# Patient Record
Sex: MALE | Race: WHITE
[De-identification: names, ages, dates, MRNs, and addresses within clinical notes are randomized per-mention and may not be internally consistent; named-entity substitution may affect disease eponyms.]

---

## 2022-11-30 ENCOUNTER — HOSPITAL ENCOUNTER (EMERGENCY)
Dept: HOSPITAL 47 - EC | Age: 77
Discharge: HOME | End: 2022-11-30
Payer: MEDICARE

## 2022-11-30 VITALS
HEART RATE: 104 BPM | DIASTOLIC BLOOD PRESSURE: 73 MMHG | TEMPERATURE: 98.6 F | SYSTOLIC BLOOD PRESSURE: 95 MMHG | RESPIRATION RATE: 16 BRPM

## 2022-11-30 DIAGNOSIS — M47.896: Primary | ICD-10-CM

## 2022-11-30 DIAGNOSIS — Z87.891: ICD-10-CM

## 2022-11-30 LAB
ALBUMIN SERPL-MCNC: 4.6 G/DL (ref 3.5–5)
ALP SERPL-CCNC: 87 U/L (ref 38–126)
ALT SERPL-CCNC: 29 U/L (ref 4–49)
ANION GAP SERPL CALC-SCNC: 10 MMOL/L
AST SERPL-CCNC: 68 U/L (ref 17–59)
BASOPHILS # BLD AUTO: 0.1 K/UL (ref 0–0.2)
BASOPHILS NFR BLD AUTO: 1 %
BUN SERPL-SCNC: 18 MG/DL (ref 9–20)
CALCIUM SPEC-MCNC: 8.9 MG/DL (ref 8.4–10.2)
CHLORIDE SERPL-SCNC: 101 MMOL/L (ref 98–107)
CO2 SERPL-SCNC: 23 MMOL/L (ref 22–30)
EOSINOPHIL # BLD AUTO: 0.1 K/UL (ref 0–0.7)
EOSINOPHIL NFR BLD AUTO: 1 %
ERYTHROCYTE [DISTWIDTH] IN BLOOD BY AUTOMATED COUNT: 4.64 M/UL (ref 4.3–5.9)
ERYTHROCYTE [DISTWIDTH] IN BLOOD: 12.4 % (ref 11.5–15.5)
GLUCOSE SERPL-MCNC: 157 MG/DL (ref 74–99)
HCT VFR BLD AUTO: 43.3 % (ref 39–53)
HGB BLD-MCNC: 14.9 GM/DL (ref 13–17.5)
LYMPHOCYTES # SPEC AUTO: 0.3 K/UL (ref 1–4.8)
LYMPHOCYTES NFR SPEC AUTO: 3 %
MCH RBC QN AUTO: 32 PG (ref 25–35)
MCHC RBC AUTO-ENTMCNC: 34.3 G/DL (ref 31–37)
MCV RBC AUTO: 93.3 FL (ref 80–100)
MONOCYTES # BLD AUTO: 0.6 K/UL (ref 0–1)
MONOCYTES NFR BLD AUTO: 6 %
NEUTROPHILS # BLD AUTO: 7.7 K/UL (ref 1.3–7.7)
NEUTROPHILS NFR BLD AUTO: 87 %
PLATELET # BLD AUTO: 218 K/UL (ref 150–450)
POTASSIUM SERPL-SCNC: 5.8 MMOL/L (ref 3.5–5.1)
PROT SERPL-MCNC: 8.1 G/DL (ref 6.3–8.2)
SODIUM SERPL-SCNC: 134 MMOL/L (ref 137–145)
WBC # BLD AUTO: 8.8 K/UL (ref 3.8–10.6)

## 2022-11-30 PROCEDURE — 72131 CT LUMBAR SPINE W/O DYE: CPT

## 2022-11-30 PROCEDURE — 72192 CT PELVIS W/O DYE: CPT

## 2022-11-30 PROCEDURE — 36415 COLL VENOUS BLD VENIPUNCTURE: CPT

## 2022-11-30 PROCEDURE — 99284 EMERGENCY DEPT VISIT MOD MDM: CPT

## 2022-11-30 PROCEDURE — 85025 COMPLETE CBC W/AUTO DIFF WBC: CPT

## 2022-11-30 PROCEDURE — 84132 ASSAY OF SERUM POTASSIUM: CPT

## 2022-11-30 PROCEDURE — 80053 COMPREHEN METABOLIC PANEL: CPT

## 2022-11-30 NOTE — CT
EXAMINATION TYPE: CT pelvis wo con

CT DLP: 2994.9 mGycm, Automated exposure control for dose reduction was used.

 

DATE OF EXAM: 11/30/2022 8:45 PM

 

COMPARISON: None

 

CLINICAL INDICATION:Male, 77 years old with history of Right hip pain.

 

TECHNIQUE:  Axial CT of the pelvis. Sagittal and coronal reformats were created on a separate worksta
tion. 

 

Contrast used: None

Oral contrast used: without Oral Contrast 

 

FINDINGS: 

BLADDER: Unremarkable

REPRODUCTIVE: Unremarkable.

STOMACH AND BOWEL: No evidence of bowel obstruction. Scattered clonic diverticula present.

PERITONEUM: No evidence of pneumoperitoneum or free fluid.

VASCULATURE: No evidence of aortic aneurysm. 

MUSCULOSKELETAL: Osteophyte formation of the femoral head and acetabulum bilaterally. Degeneration ch
anges of the lower lumbar spine. No evidence of fracture.  There is bone-on-bone articulation of the 
right hip with subchondral cystic changes and sclerosis most pronounced posteriorly..

LYMPH NODES: No gross evidence for lymphadenopathy.

SOFT TISSUE/ABDOMINAL WALL: There is large right and small left fat containing inguinal hernia.

 

IMPRESSION:

1.  No evidence of fracture.

2. End-stage right hip osteoarthrosis.

3. Bilateral fat containing inguinal hernia, right greater than left.

4. Clonic diverticulosis.

5. Multilevel disc degeneration changes.

## 2022-11-30 NOTE — ED
Back Pain HPI





- General


Chief Complaint: Back Pain/Injury


Stated Complaint: back pain


Time Seen by Provider: 11/30/22 19:47


Source: family, RN notes reviewed


Limitations: no limitations





- History of Present Illness


Initial Comments: 





This is a pleasant 77-year-old male with a history of dementia.  Patient 

recently moved here from [place] and is now staying at the [facility] 

assisted living.  Patient is a poor historian due to baseline dementia but 

states that he turned over in bed and felt a pain in his right lower back and 

right hip area.  Patient had a hard time standing.  Patient here with family 

members who gave him extra strength Tylenol prior to arrival.  Patient states he

is doing better now.  Patient was able to ambulate.  He is denying any fall.  

Pain is exacerbated by movement, alleviated by rest.





Review of systems Limited due to patient's dementia but he specifically denies 

any headache, neck pain, chest pain or shortness of breath.  No abdominal pain. 

States she feels well otherwise.


MD Complaint: back pain





- Related Data


                                    Allergies











Allergy/AdvReac Type Severity Reaction Status Date / Time


 


No Known Allergies Allergy   Verified 11/30/22 20:01














Review of Systems


ROS Statement: 


Those systems with pertinent positive or pertinent negative responses have been 

documented in the HPI.





ROS Other: All systems not noted in ROS Statement are negative.





Past Medical History


Past Medical History: Dementia


Additional Past Medical History / Comment(s): back pain


History of Any Multi-Drug Resistant Organisms: None Reported


Past Surgical History: Appendectomy, Cholecystectomy, Pacemaker


Past Psychological History: No Psychological Hx Reported


Smoking Status: Former smoker


Past Alcohol Use History: None Reported


Past Drug Use History: None Reported





General Exam





- General Exam Comments


Initial Comments: 





Patient essentially alert and oriented 2.  He is disoriented to time and 

situation to symptoms.In the hospital and believes he still in [place].  

Sounds like this is baseline for the patient.. Patient physically deconditioned.


Limitations: no limitations


General appearance: alert, in no apparent distress


Head exam: Present: atraumatic, normocephalic, normal inspection


Eye exam: Present: normal appearance, PERRL, EOMI.  Absent: scleral icterus, 

conjunctival injection, periorbital swelling


ENT exam: Present: normal exam, normal oropharynx, mucous membranes dry, mucous 

membranes moist, normal external ear exam


Neck exam: Present: normal inspection, full ROM.  Absent: tenderness, 

meningismus, lymphadenopathy


Respiratory exam: Present: normal lung sounds bilaterally.  Absent: respiratory 

distress, wheezes, rales, rhonchi, stridor


Cardiovascular Exam: Present: regular rate, normal rhythm, normal heart sounds. 

Absent: systolic murmur, diastolic murmur, rubs, gallop, clicks


GI/Abdominal exam: Present: soft, normal bowel sounds.  Absent: distended, 

tenderness, guarding, rebound, rigid


Extremities exam: Present: normal inspection, full ROM, normal capillary refill.

 Absent: tenderness, pedal edema, joint swelling, calf tenderness


Back exam: Present: normal inspection, tenderness (Right lumbar paraspinals and 

right hip area.  No erythema.  No break in skin integrity.), paraspinal 

tenderness.  Absent: CVA tenderness (L), muscle spasm, vertebral tenderness, 

rash noted


Neurological exam: Present: alert, CN II-XII intact, other (Straight leg raise 

is negative.  Patient does have some pain in the right hip with internal and 

external rotation.  No evidence of vascular insult.).  Absent: motor sensory 

deficit


Psychiatric exam: Present: normal affect, normal mood.  Absent: anxious, flat 

affect, manic


Skin exam: Present: warm, dry, intact, normal color.  Absent: rash





Course


                                   Vital Signs











  11/30/22





  19:44


 


Temperature 98.6 F


 


Pulse Rate 104 H


 


Respiratory 16





Rate 


 


Blood Pressure 95/73


 


O2 Sat by Pulse 94 L





Oximetry 














- Reevaluation(s)


Reevaluation #1: 





11/30/22 21:27


Medical record is reviewed


Symptoms are improved here in the emergency department


Patient is informed of results and questions answered


Patient in no distress


Reevaluation #2: 





11/30/22 22:16


Patient doing well.  Able to ambulate without difficulty.





Medical Decision Making





- Medical Decision Making





Patient's right low back pain and right hip pain appears to be musculoskeletal 

in origin.  This is reproducible with movement and palpation.  There appears to 

be well otherwise.  Patient is severely deconditioned.  No evidence of trauma.





There is no acute fracture noted on computed tomography scan.  Patient was able 

to ambulate without difficulty.  We will send the patient back to the assisted 

living facility.  I did give the patient follow-up with orthopedics.  Discussed 

all findings with the family and the patient.  Patient has severe DJD involving 

the lumbosacral spine as well as the right hip.  Pain is adequately controlled 

at this time with Tylenol.





Patient was told to return to the ER for any signs or symptoms worsen.  Told to 

return immediately if any other problems arise.  All questions answered.  

Treatment plan discussed.  Patient in agreement


Every effort has been made to ensure accuracy of this dictation.  However, due 

to the limitations of electronic medical records and dictation devices, errors 

in charting still occur.





Released with family in stable condition.  Same was normal.





Supervising physician Dr. Arnold





- Lab Data


Result diagrams: 


                                 11/30/22 20:10





                                 11/30/22 21:16


                                   Lab Results











  11/30/22 11/30/22 11/30/22 Range/Units





  20:10 20:10 21:16 


 


WBC  8.8    (3.8-10.6)  k/uL


 


RBC  4.64    (4.30-5.90)  m/uL


 


Hgb  14.9    (13.0-17.5)  gm/dL


 


Hct  43.3    (39.0-53.0)  %


 


MCV  93.3    (80.0-100.0)  fL


 


MCH  32.0    (25.0-35.0)  pg


 


MCHC  34.3    (31.0-37.0)  g/dL


 


RDW  12.4    (11.5-15.5)  %


 


Plt Count  218    (150-450)  k/uL


 


MPV  7.8    


 


Neutrophils %  87    %


 


Lymphocytes %  3    %


 


Monocytes %  6    %


 


Eosinophils %  1    %


 


Basophils %  1    %


 


Neutrophils #  7.7    (1.3-7.7)  k/uL


 


Lymphocytes #  0.3 L    (1.0-4.8)  k/uL


 


Monocytes #  0.6    (0-1.0)  k/uL


 


Eosinophils #  0.1    (0-0.7)  k/uL


 


Basophils #  0.1    (0-0.2)  k/uL


 


Sodium   134 L   (137-145)  mmol/L


 


Potassium   5.8 H  4.4  (3.5-5.1)  mmol/L


 


Chloride   101   ()  mmol/L


 


Carbon Dioxide   23   (22-30)  mmol/L


 


Anion Gap   10   mmol/L


 


BUN   18   (9-20)  mg/dL


 


Creatinine   0.95   (0.66-1.25)  mg/dL


 


Est GFR (CKD-EPI)AfAm   >90   (>60 ml/min/1.73 sqM)  


 


Est GFR (CKD-EPI)NonAf   78   (>60 ml/min/1.73 sqM)  


 


Glucose   157 H   (74-99)  mg/dL


 


Calcium   8.9   (8.4-10.2)  mg/dL


 


Total Bilirubin   1.6 H   (0.2-1.3)  mg/dL


 


AST   68 H   (17-59)  U/L


 


ALT   29   (4-49)  U/L


 


Alkaline Phosphatase   87   ()  U/L


 


Total Protein   8.1   (6.3-8.2)  g/dL


 


Albumin   4.6   (3.5-5.0)  g/dL














- Radiology Data


Radiology results: report reviewed, image reviewed


Computed tomography scan of the lumbar spine and pelvis interpreted by me reveal

s extensive degenerative changes with no acute findings.


Current radiology report





Disposition


Clinical Impression: 


 Arthralgia of right hip, DJD (degenerative joint disease), lumbar





Disposition: HOME SELF-CARE


Condition: Good


Instructions (If sedation given, give patient instructions):  Acute Low Back 

Pain (ED), Hip Pain (ED)


Additional Instructions: 


Make an appointment with the orthopedic physician.  He can call him again 

tomorrow morning for an appointment.  Tylenol 500 mg every 4-6 hours as needed 

for pain control.





Follow-up with your regular physician as directed.  Return to the ER immediately

if any symptoms worsen, new symptoms arise, or any other problems develop.


Is patient prescribed a controlled substance at d/c from ED?: No


Referrals: 


BRICE Reed DO [Doctor of Osteopathic Medicine] - 12/02/22


Time of Disposition: 22:15

## 2022-11-30 NOTE — CT
EXAMINATION TYPE: CT lumbar spine wo con

CT DLP: 2994.9 mGycm, Automated exposure control for dose reduction was used.

 

DATE OF EXAM: 11/30/2022 8:45 PM

 

COMPARISON: none.  

 

CLINICAL INDICATION:Male, 77 years old with history of Right low back pain; , Right side low back spencer
n

 

TECHNIQUE:  Multiple axial images were obtained from the midportion of T11 through the sacroiliac stu
nts.  Soft tissue and bone windows in coronal and sagittal planes were obtained and reviewed.

Contrast used: none.

Oral contrast used: none.

 

FINDINGS: 

Alignment: There are 5 lumbar type vertebral bodies. There is dextroscoliosis apex L3-L4.

 

Bone: Multilevel disc degeneration changes are seen throughout the spine with large osteophytes and d
isc space narrowing, subchondral cystic changes and sclerosis.

 

Discs: 

T12-L1: No spinal canal or neural foraminal stenosis is identified. 

L1-L2: No spinal canal. Facet joint arthropathy of mild neural foraminal stenosis. L2-L3: Disc bulge 
with facet joint arthropathy with mild spinal canal stenosis and mild neural foraminal stenosis.

L3-L4: Disc bulge with facet joint arthropathy with mild spinal canal stenosis and moderate neural fo
raminal stenosis.

L4-L5:  Disc bulge with facet joint arthropathy with mild spinal canal stenosis and mild to moderate 
neural foraminal stenosis.

L5-S1: Osteophytes with disc material and facet joint arthropathy with mild spinal canal stenosis and
 moderate right and mild left neural foraminal stenosis.

Other: Atherosclerosis of the arterial vasculature. Scattered clonic diverticula. Bilateral renal cys
ts.

 

IMPRESSION:

1. No evidence of fracture of the lumbar spine.

2. Severe degeneration changes of the spine with multilevel at least mild spinal canal stenosis and m
oderate neural foraminal stenosis most pronounced at L3-L4 on the left and L5-S1 on the right.

## 2023-10-26 ENCOUNTER — HOSPITAL ENCOUNTER (EMERGENCY)
Dept: HOSPITAL 47 - EC | Age: 78
Discharge: HOME | End: 2023-10-26
Payer: MEDICARE

## 2023-10-26 VITALS — TEMPERATURE: 98.7 F | RESPIRATION RATE: 18 BRPM

## 2023-10-26 VITALS — DIASTOLIC BLOOD PRESSURE: 80 MMHG | HEART RATE: 61 BPM | SYSTOLIC BLOOD PRESSURE: 156 MMHG

## 2023-10-26 DIAGNOSIS — Z90.49: ICD-10-CM

## 2023-10-26 DIAGNOSIS — F03.90: ICD-10-CM

## 2023-10-26 DIAGNOSIS — J90: Primary | ICD-10-CM

## 2023-10-26 DIAGNOSIS — Z87.891: ICD-10-CM

## 2023-10-26 LAB
ALBUMIN SERPL-MCNC: 3.7 G/DL (ref 3.5–5)
ALP SERPL-CCNC: 59 U/L (ref 38–126)
ALT SERPL-CCNC: 16 U/L (ref 4–49)
ANION GAP SERPL CALC-SCNC: 12 MMOL/L
APTT BLD: 24.8 SEC (ref 22–30)
AST SERPL-CCNC: 25 U/L (ref 17–59)
BASOPHILS # BLD AUTO: 0 K/UL (ref 0–0.2)
BASOPHILS NFR BLD AUTO: 0 %
BUN SERPL-SCNC: 16 MG/DL (ref 9–20)
CALCIUM SPEC-MCNC: 8.9 MG/DL (ref 8.4–10.2)
CHLORIDE SERPL-SCNC: 102 MMOL/L (ref 98–107)
CO2 SERPL-SCNC: 22 MMOL/L (ref 22–30)
EOSINOPHIL # BLD AUTO: 0.4 K/UL (ref 0–0.7)
EOSINOPHIL NFR BLD AUTO: 5 %
ERYTHROCYTE [DISTWIDTH] IN BLOOD BY AUTOMATED COUNT: 4.28 M/UL (ref 4.3–5.9)
ERYTHROCYTE [DISTWIDTH] IN BLOOD: 14.1 % (ref 11.5–15.5)
GLUCOSE SERPL-MCNC: 98 MG/DL (ref 74–99)
HCT VFR BLD AUTO: 41.7 % (ref 39–53)
HGB BLD-MCNC: 13.6 GM/DL (ref 13–17.5)
INR PPP: 1 (ref ?–1.2)
LIPASE SERPL-CCNC: 72 U/L (ref 23–300)
LYMPHOCYTES # SPEC AUTO: 1.2 K/UL (ref 1–4.8)
LYMPHOCYTES NFR SPEC AUTO: 17 %
MAGNESIUM SPEC-SCNC: 1.5 MG/DL (ref 1.6–2.3)
MCH RBC QN AUTO: 31.8 PG (ref 25–35)
MCHC RBC AUTO-ENTMCNC: 32.7 G/DL (ref 31–37)
MCV RBC AUTO: 97.3 FL (ref 80–100)
MONOCYTES # BLD AUTO: 0.4 K/UL (ref 0–1)
MONOCYTES NFR BLD AUTO: 6 %
NEUTROPHILS # BLD AUTO: 4.9 K/UL (ref 1.3–7.7)
NEUTROPHILS NFR BLD AUTO: 69 %
PLATELET # BLD AUTO: 163 K/UL (ref 150–450)
POTASSIUM SERPL-SCNC: 3.9 MMOL/L (ref 3.5–5.1)
PROT SERPL-MCNC: 6.7 G/DL (ref 6.3–8.2)
PT BLD: 10.9 SEC (ref 10–12.5)
SODIUM SERPL-SCNC: 136 MMOL/L (ref 137–145)
WBC # BLD AUTO: 7 K/UL (ref 3.8–10.6)

## 2023-10-26 PROCEDURE — 83690 ASSAY OF LIPASE: CPT

## 2023-10-26 PROCEDURE — 83735 ASSAY OF MAGNESIUM: CPT

## 2023-10-26 PROCEDURE — 85025 COMPLETE CBC W/AUTO DIFF WBC: CPT

## 2023-10-26 PROCEDURE — 71045 X-RAY EXAM CHEST 1 VIEW: CPT

## 2023-10-26 PROCEDURE — 85610 PROTHROMBIN TIME: CPT

## 2023-10-26 PROCEDURE — 84484 ASSAY OF TROPONIN QUANT: CPT

## 2023-10-26 PROCEDURE — 36415 COLL VENOUS BLD VENIPUNCTURE: CPT

## 2023-10-26 PROCEDURE — 80053 COMPREHEN METABOLIC PANEL: CPT

## 2023-10-26 PROCEDURE — 99285 EMERGENCY DEPT VISIT HI MDM: CPT

## 2023-10-26 PROCEDURE — 96365 THER/PROPH/DIAG IV INF INIT: CPT

## 2023-10-26 PROCEDURE — 85730 THROMBOPLASTIN TIME PARTIAL: CPT

## 2023-10-26 PROCEDURE — 93005 ELECTROCARDIOGRAM TRACING: CPT

## 2023-10-26 NOTE — XR
EXAM:

  XR Chest, 1 View

 

CLINICAL HISTORY:

  Pain

 

TECHNIQUE:

  Frontal view of the chest.

 

COMPARISON:

  No relevant prior studies available.

 

FINDINGS:

  Left subclavian pacemaker.  Heart is enlarged.  Diffuse interstitial 

prominence compatible with CHF.  Focal dense consolidation in the left 

lung base.  Probable left pleural effusion.  No pneumothorax.  Bones are 

unremarkable

 

IMPRESSION:     

Cardiomegaly.  CHF.  Left pleural effusion.  Dense left basilar 

infiltrate/pneumonia.

## 2023-10-26 NOTE — ED
General Adult HPI





- General


Chief complaint: Chest Pain


Stated complaint: Chest Pain


Time Seen by Provider: 10/26/23 02:50


Source: patient, EMS, RN notes reviewed, old records reviewed


Mode of arrival: EMS


Limitations: altered mental status





- History of Present Illness


Initial comments: 





77-year-old male history of dementia had presented for evaluation of chest pain.

 Apparently the patient had complained of chest pain while at the nursing home 

this had resolved during initial EMS transport.  Patient denies chest pain at 

the time my evaluation, he has no complaints.  Patient is likely a very poor 

historian.





- Related Data


                                    Allergies











Allergy/AdvReac Type Severity Reaction Status Date / Time


 


No Known Allergies Allergy   Verified 11/30/22 20:01














Review of Systems


ROS Statement: 


Those systems with pertinent positive or pertinent negative responses have been 

documented in the HPI.





ROS Other: All systems not noted in ROS Statement are negative.





Past Medical History


Past Medical History: Dementia


Additional Past Medical History / Comment(s): back pain


History of Any Multi-Drug Resistant Organisms: None Reported


Past Surgical History: Appendectomy, Cholecystectomy, Pacemaker


Past Psychological History: No Psychological Hx Reported


Smoking Status: Former smoker


Past Alcohol Use History: None Reported


Past Drug Use History: None Reported





General Exam


General appearance: alert, in no apparent distress


Head exam: Present: atraumatic, normocephalic


Eye exam: Present: normal appearance, PERRL


ENT exam: Present: normal exam


Neck exam: Present: normal inspection.  Absent: tenderness, meningismus


Respiratory exam: Present: normal lung sounds bilaterally.  Absent: respiratory 

distress, wheezes


Cardiovascular Exam: Present: regular rate, normal rhythm


GI/Abdominal exam: Present: soft.  Absent: distended, tenderness, guarding, 

rebound


Extremities exam: Present: normal inspection, normal capillary refill.  Absent: 

pedal edema


Neurological exam: Present: alert, oriented X3, CN II-XII intact.  Absent: motor

sensory deficit


Psychiatric exam: Present: normal affect, normal mood


Skin exam: Present: warm, dry, intact.  Absent: cyanosis, diaphoretic





Course


                                   Vital Signs











  10/26/23





  02:46


 


Temperature 98.7 F


 


Pulse Rate 63


 


Respiratory 18





Rate 


 


Blood Pressure 141/75


 


O2 Sat by Pulse 96





Oximetry 














Medical Decision Making





- Medical Decision Making





Was pt. sent in by a medical professional or institution (, PA, NP, urgent 

care, hospital, or nursing home...) When possible be specific


@  -No


Did you speak to anyone other than the patient for history (EMS, parent, family,

police, friend...)? What history was obtained from this source 


@  -Patient's son who is at bedside


Did you review nursing and triage notes (agree or disagree)?  Why? 


@  -I reviewed and agree with nursing and triage notes


Were old charts reviewed (outside hosp., previous admission, EMS record, old 

EKG, old radiological studies, urgent care reports/EKG's, nursing home records)?

Report findings 


@  -No old charts were reviewed


Differential Diagnosis (chest pain, altered mental status, abdominal pain women,

abdominal pain men, vaginal bleeding, weakness, fever, dyspnea, syncope, 

headache, dizziness, GI bleed, back pain, seizure, CVA, palpatations, mental 

health, musculoskeletal)? 


@  Differential Chest Pain:


Stable Angina, Unstable Angina, STEMI, NSTEMI Aortic Dissection, Pneumothorax, 

Musculoskeletal, Esophageal Spasm GERD, Cholecystitis, Pancreatitis, Zoster, 

this is not meant to be an all-inclusive list. 


EKG interpreted by me (3pts min.).


@Sinus rhythm rate of 67, MD interval 193, QRS duration 98, , no ST 

segment elevation.


X-rays interpreted by me (1pt min.).


@  -Low lung volumes, left pleural effusion.  Interpreted as infiltrate however 

patient has no fever, no cough, no dyspnea, no normal white blood cell count.


CT interpreted by me (1pt min.).


@  -None done


U/S interpreted by me (1pt. min.).


@  -None done


What testing was considered but not performed or refused? (CT, X-rays, U/S, 

labs)? Why?


@  -None


What meds were considered but not given or refused? Why?


@  -None


Did you discuss the management of the patient with other professionals (

professionals i.e. , PA, NP, lab, RT, psych nurse, , , 

teacher, , )? Give summary


@  -No


Was smoking cessation discussed for >3mins.?


@  -No


Was critical care preformed (if so, how long)?


@  -No


Were there social determinants of health that impacted care today? How? 

(Homelessness, low income, unemployed, alcoholism, drug addiction, transpo

rtation, low edu. Level, literacy, decrease access to med. care, alf, rehab)?


@  -No


Was there de-escalation of care discussed even if they declined (Discuss DNR or 

withdrawal of care, Hospice)? DNR status


@  -No


What co-morbidities impacted this encounter? (DM, HTN, Smoking, COPD, CAD, 

Cancer, CVA, ARF, Chemo, Hep., AIDS, mental health diagnosis, sleep apnea, 

morbid obesity)?


@Mann


Was patient admitted / discharged? Hospital course, mention meds given and 

route, prescriptions, significant lab abnormalities, going to OR and other 

pertinent info.


@  -76 yo male who presented with an episode of chest pain.  Patient does not 

recall this and is unable to characterize the pain.  He has no further pain at 

the time my evaluation or while he is in the emergency department.  EKG is sinus

without ST segment elevation.  He has a normal CBC, normal CMP, negative tropo

stacy.  I do feel this patient is stable for discharge with return parameters.  He

will return to the nursing home.


Undiagnosed new problem with uncertain prognosis?


@  -No


Drug Therapy requiring intensive monitoring for toxicity (Heparin, Nitro, 

Insulin, Cardizem)?


@  -No


Were any procedures done?


@  -No


Diagnosis/symptom?


@  -[Chest pain, resolved


Acute, or Chronic, or Acute on Chronic?


@  -[Acute


Uncomplicated (without systemic symptoms) or Complicated (systemic symptoms)?


@  -[Complicated


Side effects of treatment?


@  -No


Exacerbation, Progression, or Severe Exacerbation?


@  -No


Poses a threat to life or bodily function? How? (Chest pain, USA, MI, pneumonia,

 PE, COPD, DKA, ARF, appy, cholecystitis, CVA, Diverticulitis, Homicidal, 

Suicidal, threat to staff... and all critical care pts)


@  -[Yes, chest pain





- Lab Data


Result diagrams: 


                                 10/26/23 02:55





                                 10/26/23 02:55


                                   Lab Results











  10/26/23 10/26/23 10/26/23 Range/Units





  02:55 02:55 02:55 


 


WBC  7.0    (3.8-10.6)  k/uL


 


RBC  4.28 L    (4.30-5.90)  m/uL


 


Hgb  13.6    (13.0-17.5)  gm/dL


 


Hct  41.7    (39.0-53.0)  %


 


MCV  97.3    (80.0-100.0)  fL


 


MCH  31.8    (25.0-35.0)  pg


 


MCHC  32.7    (31.0-37.0)  g/dL


 


RDW  14.1    (11.5-15.5)  %


 


Plt Count  163    (150-450)  k/uL


 


MPV  8.5    


 


Neutrophils %  69    %


 


Lymphocytes %  17    %


 


Monocytes %  6    %


 


Eosinophils %  5    %


 


Basophils %  0    %


 


Neutrophils #  4.9    (1.3-7.7)  k/uL


 


Lymphocytes #  1.2    (1.0-4.8)  k/uL


 


Monocytes #  0.4    (0-1.0)  k/uL


 


Eosinophils #  0.4    (0-0.7)  k/uL


 


Basophils #  0.0    (0-0.2)  k/uL


 


PT   10.9   (10.0-12.5)  sec


 


INR   1.0   (<1.2)  


 


APTT   24.8   (22.0-30.0)  sec


 


Sodium    136 L  (137-145)  mmol/L


 


Potassium    3.9  (3.5-5.1)  mmol/L


 


Chloride    102  ()  mmol/L


 


Carbon Dioxide    22  (22-30)  mmol/L


 


Anion Gap    12  mmol/L


 


BUN    16  (9-20)  mg/dL


 


Creatinine    0.59 L  (0.66-1.25)  mg/dL


 


Est GFR (CKD-EPI)AfAm    >90  (>60 ml/min/1.73 sqM)  


 


Est GFR (CKD-EPI)NonAf    >90  (>60 ml/min/1.73 sqM)  


 


Glucose    98  (74-99)  mg/dL


 


Calcium    8.9  (8.4-10.2)  mg/dL


 


Magnesium    1.5 L  (1.6-2.3)  mg/dL


 


Total Bilirubin    1.3  (0.2-1.3)  mg/dL


 


AST    25  (17-59)  U/L


 


ALT    16  (4-49)  U/L


 


Alkaline Phosphatase    59  ()  U/L


 


Troponin I     (0.000-0.034)  ng/mL


 


Total Protein    6.7  (6.3-8.2)  g/dL


 


Albumin    3.7  (3.5-5.0)  g/dL


 


Lipase    72  ()  U/L














  10/26/23 Range/Units





  02:55 


 


WBC   (3.8-10.6)  k/uL


 


RBC   (4.30-5.90)  m/uL


 


Hgb   (13.0-17.5)  gm/dL


 


Hct   (39.0-53.0)  %


 


MCV   (80.0-100.0)  fL


 


MCH   (25.0-35.0)  pg


 


MCHC   (31.0-37.0)  g/dL


 


RDW   (11.5-15.5)  %


 


Plt Count   (150-450)  k/uL


 


MPV   


 


Neutrophils %   %


 


Lymphocytes %   %


 


Monocytes %   %


 


Eosinophils %   %


 


Basophils %   %


 


Neutrophils #   (1.3-7.7)  k/uL


 


Lymphocytes #   (1.0-4.8)  k/uL


 


Monocytes #   (0-1.0)  k/uL


 


Eosinophils #   (0-0.7)  k/uL


 


Basophils #   (0-0.2)  k/uL


 


PT   (10.0-12.5)  sec


 


INR   (<1.2)  


 


APTT   (22.0-30.0)  sec


 


Sodium   (137-145)  mmol/L


 


Potassium   (3.5-5.1)  mmol/L


 


Chloride   ()  mmol/L


 


Carbon Dioxide   (22-30)  mmol/L


 


Anion Gap   mmol/L


 


BUN   (9-20)  mg/dL


 


Creatinine   (0.66-1.25)  mg/dL


 


Est GFR (CKD-EPI)AfAm   (>60 ml/min/1.73 sqM)  


 


Est GFR (CKD-EPI)NonAf   (>60 ml/min/1.73 sqM)  


 


Glucose   (74-99)  mg/dL


 


Calcium   (8.4-10.2)  mg/dL


 


Magnesium   (1.6-2.3)  mg/dL


 


Total Bilirubin   (0.2-1.3)  mg/dL


 


AST   (17-59)  U/L


 


ALT   (4-49)  U/L


 


Alkaline Phosphatase   ()  U/L


 


Troponin I  <0.012  (0.000-0.034)  ng/mL


 


Total Protein   (6.3-8.2)  g/dL


 


Albumin   (3.5-5.0)  g/dL


 


Lipase   ()  U/L














Disposition


Clinical Impression: 


 Chest pain





Disposition: HOME SELF-CARE


Condition: Fair


Instructions (If sedation given, give patient instructions):  Chest Pain (ED)


Is patient prescribed a controlled substance at d/c from ED?: No


Referrals: 


None,Stated [REFERRING] - 1-2 days


Time of Disposition: 04:30